# Patient Record
Sex: FEMALE | Race: WHITE | NOT HISPANIC OR LATINO
[De-identification: names, ages, dates, MRNs, and addresses within clinical notes are randomized per-mention and may not be internally consistent; named-entity substitution may affect disease eponyms.]

---

## 2017-12-01 ENCOUNTER — APPOINTMENT (OUTPATIENT)
Dept: MAMMOGRAPHY | Facility: HOSPITAL | Age: 67
End: 2017-12-01
Payer: COMMERCIAL

## 2017-12-01 ENCOUNTER — APPOINTMENT (OUTPATIENT)
Dept: ULTRASOUND IMAGING | Facility: HOSPITAL | Age: 67
End: 2017-12-01
Payer: COMMERCIAL

## 2017-12-01 ENCOUNTER — OUTPATIENT (OUTPATIENT)
Dept: OUTPATIENT SERVICES | Facility: HOSPITAL | Age: 67
LOS: 1 days | End: 2017-12-01
Payer: COMMERCIAL

## 2017-12-01 DIAGNOSIS — N60.01 SOLITARY CYST OF RIGHT BREAST: ICD-10-CM

## 2017-12-01 PROCEDURE — 76641 ULTRASOUND BREAST COMPLETE: CPT | Mod: 26

## 2017-12-01 PROCEDURE — G0202: CPT | Mod: 26

## 2017-12-01 PROCEDURE — 77063 BREAST TOMOSYNTHESIS BI: CPT | Mod: 26

## 2017-12-01 PROCEDURE — 77063 BREAST TOMOSYNTHESIS BI: CPT

## 2017-12-01 PROCEDURE — 76641 ULTRASOUND BREAST COMPLETE: CPT

## 2017-12-01 PROCEDURE — 77067 SCR MAMMO BI INCL CAD: CPT

## 2018-07-20 ENCOUNTER — APPOINTMENT (OUTPATIENT)
Dept: SURGERY | Facility: CLINIC | Age: 68
End: 2018-07-20
Payer: COMMERCIAL

## 2018-07-20 VITALS — WEIGHT: 180 LBS | HEIGHT: 62 IN | BODY MASS INDEX: 33.13 KG/M2

## 2018-07-20 DIAGNOSIS — Z00.00 ENCOUNTER FOR GENERAL ADULT MEDICAL EXAMINATION W/OUT ABNORMAL FINDINGS: ICD-10-CM

## 2018-07-20 PROCEDURE — 99202 OFFICE O/P NEW SF 15 MIN: CPT

## 2018-07-27 ENCOUNTER — APPOINTMENT (OUTPATIENT)
Dept: SURGERY | Facility: CLINIC | Age: 68
End: 2018-07-27
Payer: COMMERCIAL

## 2018-07-27 DIAGNOSIS — S09.8XXA OTHER SPECIFIED INJURIES OF HEAD, INITIAL ENCOUNTER: ICD-10-CM

## 2018-07-27 PROCEDURE — 99213 OFFICE O/P EST LOW 20 MIN: CPT

## 2019-01-25 ENCOUNTER — EMERGENCY (EMERGENCY)
Facility: HOSPITAL | Age: 69
LOS: 0 days | Discharge: HOME | End: 2019-01-25
Attending: EMERGENCY MEDICINE | Admitting: EMERGENCY MEDICINE
Payer: MEDICARE

## 2019-01-25 VITALS
OXYGEN SATURATION: 100 % | SYSTOLIC BLOOD PRESSURE: 162 MMHG | RESPIRATION RATE: 18 BRPM | TEMPERATURE: 98 F | WEIGHT: 184.97 LBS | HEART RATE: 78 BPM | DIASTOLIC BLOOD PRESSURE: 84 MMHG

## 2019-01-25 DIAGNOSIS — S82.002A UNSPECIFIED FRACTURE OF LEFT PATELLA, INITIAL ENCOUNTER FOR CLOSED FRACTURE: ICD-10-CM

## 2019-01-25 DIAGNOSIS — Z79.82 LONG TERM (CURRENT) USE OF ASPIRIN: ICD-10-CM

## 2019-01-25 DIAGNOSIS — F41.9 ANXIETY DISORDER, UNSPECIFIED: ICD-10-CM

## 2019-01-25 DIAGNOSIS — M25.562 PAIN IN LEFT KNEE: ICD-10-CM

## 2019-01-25 DIAGNOSIS — K21.9 GASTRO-ESOPHAGEAL REFLUX DISEASE WITHOUT ESOPHAGITIS: ICD-10-CM
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
## 2019-01-25 DIAGNOSIS — I10 ESSENTIAL (PRIMARY) HYPERTENSION: ICD-10-CM

## 2019-01-25 PROCEDURE — 73560 X-RAY EXAM OF KNEE 1 OR 2: CPT | Mod: 26,LT

## 2019-01-25 PROCEDURE — 99283 EMERGENCY DEPT VISIT LOW MDM: CPT

## 2019-01-25 RX ORDER — IBUPROFEN 200 MG
1 TABLET ORAL
Qty: 30 | Refills: 0 | OUTPATIENT
Start: 2019-01-25

## 2019-01-25 NOTE — ED ADULT NURSE NOTE - OBJECTIVE STATEMENT
Pt trip and fell over [email or web address] blocks and landed on left knee, neg loc. Pt knee is swollen and echymotic.

## 2019-01-25 NOTE — ED PROVIDER NOTE - CARE PLAN
Principal Discharge DX:	Closed nondisplaced fracture of left patella, unspecified fracture morphology, initial encounter

## 2019-01-25 NOTE — ED PROVIDER NOTE - OBJECTIVE STATEMENT
69 y/o female with no pertinent PMHx on baby ASA presents to the ED s/p fall .5 hours ago c/o left knee pain. Pt was getting out of her car when she slipped on the curb and fell on her left knee. Pt states her next door neighbor who is an EMT helped her get up as she was unable to get up from the floor herself. Denies abd pain, head pain, chest pain, head trauma. Dr. Peter Kurzweil. 69 y/o female with no pertinent PMHx on baby ASA presents to the ED s/p fall 30 minutes ago c/o left knee pain. Pt was getting out of her car when she slipped on the curb and fell on her left knee. Pt states her next door neighbor who is an EMT helped her get up as she was unable to get up from the floor herself. Denies abd pain, head pain, chest pain, head trauma. Dr. Peter Kurzweil. 69 y/o female with no pertinent PMHx on baby ASA presents to the ED s/p fall 30 minutes ago c/o left knee pain. Pt was getting out of her car when she slipped on the curb and fell on her left knee. Pt states her next door neighbor who is an EMT helped her get up as she was unable to get up from the floor herself. Denies abd pain, head pain, chest pain, head trauma. PCP: Dr. Peter Kurzweil.

## 2019-01-25 NOTE — ED PROVIDER NOTE - CARE PROVIDER_API CALL
Otis John (DO), Orthopaedic Surgery  [address] [place] [address]  Phone: [phone]  Fax: [phone]

## 2019-01-25 NOTE — ED PROVIDER NOTE - MUSCULOSKELETAL, MLM
Spine appears normal, range of motion is not limited. +LLE swelling, mild ecchymosis left knee. +TTP anterior knee. +2 palpable pulses. Distal vascular motor intact. Spine appears normal, range of motion is not limited. +LLE swelling, mild ecchymosis left knee. +TTP anterior knee. +ecchymosis to right elbow no TTP. +2 palpable pulses. Distal vascular motor intact.

## 2019-01-25 NOTE — ED PROVIDER NOTE - NSFOLLOWUPINSTRUCTIONS_ED_ALL_ED_FT
Partial weight bearing as tolerated. Please call and follow up with orthopedic doctor. Use Motrin and Percocet as needed for pain control.

## 2019-01-25 NOTE — CONSULT NOTE ADULT - SUBJECTIVE AND OBJECTIVE BOX
HPI:  69 yo F w/ PMHx of anxiety, takes baby aspirin, presents to the ED after sustaining a mechanical fall with L knee pain. Pt states she was getting out of her car when she slipped and landed directly on her L knee. She was unable to get up due to the pain and was brought to the ED. She denies head trauma, LOC, other injuries. No numbness or tingling. Pt has an orthopedic surgeon, Dr. Washington who she sees regularly. No fevers, chills, sob, cp, n/v.     PAST MEDICAL & SURGICAL HISTORY:  S/P D&C: cryoablation  History of Arthroscopy: right shoulder   History of Arthroscopy of Knee: left   History of Cholecystectomy: laparoscopic   Ectopic Pregnancy:   Anxiety  Bronchitis:  rxed with abxs  GERD (Gastroesophageal Reflux Disease)  Hypertension  H/O: : x 3    Home Medications:  Zoloft    Allergies    No Known Allergies    Intolerances    Vital Signs Last 24 Hrs  T(C): 36.8 (2019 09:08), Max: 36.8 (2019 09:08)  T(F): 98.3 (2019 09:08), Max: 98.3 (2019 09:08)  HR: 78 (2019 09:08) (78 - 78)  BP: 162/84 (2019 09:08) (162/84 - 162/84)  BP(mean): --  RR: 18 (2019 09:08) (18 - 18)  SpO2: 100% (2019 09:08) (100% - 100%)    PE:  Gen: NAD with  at bedside  LLE:  Skin intact, moderate ecchymoses  Mild swelling  +ttp over patella  Pain with flexion of L knee  Able to SLR, actively extend knee  Extensor mechanism intact  +EHL/FHL/Gsc/TA  SILT L2-S1  2+ DP    Secondary Survey:  No head trauma  No ttp along c/t/l/s spine  No ttp along bony prominences diffusely, other than mentioned above  A/PROM intact in all joints diffusely, other than mentioned above  SILT/NVI diffusely  Compartments soft and compressible diffusely    XR L Knee: Transverse displaced L patellar fracture

## 2019-01-25 NOTE — CONSULT NOTE ADULT - ASSESSMENT
A/P:  67 yo F s/p mechanical fall, w/ L patellar fracture:  -placed in bulky lundberg knee immobilizer  -pain control  -PWB of LLE in bulky lundberg knee immobilizer when ambulating  -ice  -extensor mechanism intact, however may possibly need surgical intervention in near future  -FU with Dr. John or own ortho surgeon Dr. Washington, call office once discharged and follow up in 1-3 days  -no acute orthopedic surgical intervention indicated at this time  -ortho stable for DC  -discussed w/ Dr. John, in agreement with above plan

## 2019-01-25 NOTE — ED ADULT NURSE NOTE - NSIMPLEMENTINTERV_GEN_ALL_ED
Implemented All Fall Risk Interventions:  [place] to call system. Call bell, personal items and telephone within reach. Instruct patient to call for assistance. Room bathroom lighting operational. Non-slip footwear when patient is off stretcher. Physically safe environment: no spills, clutter or unnecessary equipment. Stretcher in lowest position, wheels locked, appropriate side rails in place. Provide visual cue, wrist band, yellow gown, etc. Monitor gait and stability. Monitor for mental status changes and reorient to person, place, and time. Review medications for side effects contributing to fall risk. Reinforce activity limits and safety measures with patient and family.

## 2019-01-25 NOTE — ED PROVIDER NOTE - PROGRESS NOTE DETAILS
Sanket LUCAS for Dr. Mccain: Pt declines pain medicine. Sanket LUCAS for Dr. Mccain: Orthopedics paged for consult. Sanket LUCAS for Dr. Mccain: Discussed with Magdiel from ortho to see the pt. Ortho will evaluate pt. Sanket LUCAS for Dr. Mccain: Ortho evaluated pt. Pt can be DC, partial weight bearing at this time. Pt placed in knee immobilizer Clia Id #: [id number] Clia Id #: [id number]

## 2022-05-11 ENCOUNTER — RESULT REVIEW (OUTPATIENT)
Age: 72
End: 2022-05-11

## 2022-05-11 ENCOUNTER — APPOINTMENT (OUTPATIENT)
Dept: MAMMOGRAPHY | Facility: CLINIC | Age: 72
End: 2022-05-11
Payer: MEDICARE

## 2022-05-11 ENCOUNTER — APPOINTMENT (OUTPATIENT)
Dept: RADIOLOGY | Facility: CLINIC | Age: 72
End: 2022-05-11
Payer: MEDICARE

## 2022-05-11 ENCOUNTER — OUTPATIENT (OUTPATIENT)
Dept: OUTPATIENT SERVICES | Facility: HOSPITAL | Age: 72
LOS: 1 days | End: 2022-05-11
Payer: MEDICARE

## 2022-05-11 ENCOUNTER — APPOINTMENT (OUTPATIENT)
Dept: ULTRASOUND IMAGING | Facility: CLINIC | Age: 72
End: 2022-05-11
Payer: MEDICARE

## 2022-05-11 DIAGNOSIS — Z00.8 ENCOUNTER FOR OTHER GENERAL EXAMINATION: ICD-10-CM

## 2022-05-11 PROCEDURE — 77067 SCR MAMMO BI INCL CAD: CPT | Mod: 26

## 2022-05-11 PROCEDURE — 77067 SCR MAMMO BI INCL CAD: CPT

## 2022-05-11 PROCEDURE — 77063 BREAST TOMOSYNTHESIS BI: CPT | Mod: 26

## 2022-05-11 PROCEDURE — 77080 DXA BONE DENSITY AXIAL: CPT

## 2022-05-11 PROCEDURE — 77080 DXA BONE DENSITY AXIAL: CPT | Mod: 26

## 2022-05-11 PROCEDURE — 77063 BREAST TOMOSYNTHESIS BI: CPT

## 2022-05-12 ENCOUNTER — TRANSCRIPTION ENCOUNTER (OUTPATIENT)
Age: 72
End: 2022-05-12